# Patient Record
Sex: FEMALE | Race: AMERICAN INDIAN OR ALASKA NATIVE
[De-identification: names, ages, dates, MRNs, and addresses within clinical notes are randomized per-mention and may not be internally consistent; named-entity substitution may affect disease eponyms.]

---

## 2017-03-15 ENCOUNTER — HOSPITAL ENCOUNTER (EMERGENCY)
Dept: HOSPITAL 5 - ED | Age: 37
Discharge: HOME | End: 2017-03-15
Payer: MEDICAID

## 2017-03-15 VITALS — DIASTOLIC BLOOD PRESSURE: 104 MMHG | SYSTOLIC BLOOD PRESSURE: 173 MMHG
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
## 2017-03-15 DIAGNOSIS — I10: ICD-10-CM
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
## 2017-03-15 DIAGNOSIS — S93.401A: Primary | ICD-10-CM
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
## 2017-03-15 DIAGNOSIS — Z88.8: ICD-10-CM
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
## 2017-03-15 NOTE — XRAY REPORT
RIGHT ANKLE, 3 views:



History: Pain after long.



Findings: Mild soft tissue swelling is identified.  No acute osseous 

abnormality or joint pathology is identified.  The fifth metatarsal 

base is intact.



Impression: Soft tissue swelling.  No acute osseous injury.

## 2017-06-30 ENCOUNTER — HOSPITAL ENCOUNTER (EMERGENCY)
Dept: HOSPITAL 5 - ED | Age: 37
Discharge: HOME | End: 2017-06-30
Payer: MEDICAID

## 2017-06-30 VITALS — SYSTOLIC BLOOD PRESSURE: 152 MMHG | DIASTOLIC BLOOD PRESSURE: 90 MMHG

## 2017-06-30 DIAGNOSIS — Z88.8: ICD-10-CM

## 2017-06-30 DIAGNOSIS — S16.1XXA: Primary | ICD-10-CM

## 2017-06-30 DIAGNOSIS — G89.29: ICD-10-CM
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
## 2017-06-30 DIAGNOSIS — F17.200: ICD-10-CM

## 2017-06-30 DIAGNOSIS — S39.012A: ICD-10-CM
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
## 2017-06-30 DIAGNOSIS — Z98.890: ICD-10-CM
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
## 2017-06-30 DIAGNOSIS — I10: ICD-10-CM

## 2017-06-30 LAB
ALBUMIN SERPL-MCNC: 4.4 G/DL (ref 3.9–5)
ALBUMIN/GLOB SERPL: 1.3 %
ALP SERPL-CCNC: 58 UNITS/L (ref 35–129)
ALT SERPL-CCNC: 6 UNITS/L (ref 7–56)
ANION GAP SERPL CALC-SCNC: 17 MMOL/L
BASOPHILS NFR BLD AUTO: 0.6 % (ref 0–1.8)
BILIRUB SERPL-MCNC: 0.6 MG/DL (ref 0.1–1.2)
BILIRUB UR QL STRIP: (no result)
BLOOD UR QL VISUAL: (no result)
BUN SERPL-MCNC: 9 MG/DL (ref 7–17)
BUN/CREAT SERPL: 11.25 %
CALCIUM SERPL-MCNC: 9.2 MG/DL (ref 8.4–10.2)
CHLORIDE SERPL-SCNC: 102.6 MMOL/L (ref 98–107)
CO2 SERPL-SCNC: 25 MMOL/L (ref 22–30)
EOSINOPHIL NFR BLD AUTO: 0.2 % (ref 0–4.3)
GLUCOSE SERPL-MCNC: 107 MG/DL (ref 65–100)
HCT VFR BLD CALC: 30.6 % (ref 30.3–42.9)
HGB BLD-MCNC: 9.1 GM/DL (ref 10.1–14.3)
KETONES UR STRIP-MCNC: (no result) MG/DL
LEUKOCYTE ESTERASE UR QL STRIP: (no result)
MCH RBC QN AUTO: 21 PG (ref 28–32)
MCHC RBC AUTO-ENTMCNC: 30 % (ref 30–34)
MCV RBC AUTO: 70 FL (ref 79–97)
NITRITE UR QL STRIP: (no result)
PH UR STRIP: 5 [PH] (ref 5–7)
PLATELET # BLD: 236 K/MM3 (ref 140–440)
POTASSIUM SERPL-SCNC: 4 MMOL/L (ref 3.6–5)
PROT SERPL-MCNC: 7.7 G/DL (ref 6.3–8.2)
PROT UR STRIP-MCNC: (no result) MG/DL
RBC # BLD AUTO: 4.41 M/MM3 (ref 3.65–5.03)
RBC #/AREA URNS HPF: 4 /HPF (ref 0–6)
SODIUM SERPL-SCNC: 141 MMOL/L (ref 137–145)
UROBILINOGEN UR-MCNC: < 2 MG/DL (ref ?–2)
WBC # BLD AUTO: 9 K/MM3 (ref 4.5–11)
WBC #/AREA URNS HPF: 5 /HPF (ref 0–6)

## 2017-06-30 PROCEDURE — 36415 COLL VENOUS BLD VENIPUNCTURE: CPT

## 2017-06-30 PROCEDURE — 81001 URINALYSIS AUTO W/SCOPE: CPT

## 2017-06-30 PROCEDURE — 90715 TDAP VACCINE 7 YRS/> IM: CPT

## 2017-06-30 PROCEDURE — 90471 IMMUNIZATION ADMIN: CPT

## 2017-06-30 PROCEDURE — 85025 COMPLETE CBC W/AUTO DIFF WBC: CPT

## 2017-06-30 PROCEDURE — 72131 CT LUMBAR SPINE W/O DYE: CPT

## 2017-06-30 PROCEDURE — 71020: CPT

## 2017-06-30 PROCEDURE — 80053 COMPREHEN METABOLIC PANEL: CPT

## 2017-06-30 PROCEDURE — 72125 CT NECK SPINE W/O DYE: CPT

## 2017-06-30 PROCEDURE — 81025 URINE PREGNANCY TEST: CPT
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
## 2019-07-04 ENCOUNTER — HOSPITAL ENCOUNTER (EMERGENCY)
Dept: HOSPITAL 5 - ED | Age: 39
Discharge: HOME | End: 2019-07-04
Payer: MEDICAID

## 2019-07-04 VITALS — DIASTOLIC BLOOD PRESSURE: 89 MMHG | SYSTOLIC BLOOD PRESSURE: 134 MMHG

## 2019-07-04 DIAGNOSIS — F17.200: ICD-10-CM

## 2019-07-04 DIAGNOSIS — Z88.8: ICD-10-CM

## 2019-07-04 DIAGNOSIS — M54.16: Primary | ICD-10-CM

## 2019-07-04 DIAGNOSIS — Z79.899: ICD-10-CM

## 2019-07-04 DIAGNOSIS — M19.90: ICD-10-CM

## 2019-07-04 DIAGNOSIS — I10: ICD-10-CM

## 2019-07-04 LAB
BACTERIA #/AREA URNS HPF: (no result) /HPF
BILIRUB UR QL STRIP: (no result)
BLOOD UR QL VISUAL: (no result)
PH UR STRIP: 6 [PH] (ref 5–7)
PROT UR STRIP-MCNC: (no result) MG/DL
RBC #/AREA URNS HPF: 2 /HPF (ref 0–6)
UROBILINOGEN UR-MCNC: 2 MG/DL (ref ?–2)
WBC #/AREA URNS HPF: 1 /HPF (ref 0–6)

## 2019-07-04 PROCEDURE — 81025 URINE PREGNANCY TEST: CPT

## 2019-07-04 PROCEDURE — 81001 URINALYSIS AUTO W/SCOPE: CPT

## 2019-07-04 NOTE — EMERGENCY DEPARTMENT REPORT
HPI





- General


Chief Complaint: Back Pain/Injury


Time Seen by Provider: 19 14:56





- HPI


HPI: 





This is a 38-year-old female with a history of lumbar disc herniation in L5 who 

presents to the ED complaining of acute on chronic back pain worsened by prolo

nged standing at work today.  The patient denies any trauma, injuries or falls 

today.  Patient states that pain is a progressive lower back and radiates down 

her right thigh





ED Past Medical Hx





- Past Medical History


Hx Hypertension: Yes


Hx Arthritis: Yes


Additional medical history: CHRONIC BACK PAIN





- Surgical History


Additional Surgical History: 





- Social History


Smoking Status: Current Every Day Smoker


Substance Use Type: Alcohol





- Medications


Home Medications: 


                                Home Medications











 Medication  Instructions  Recorded  Confirmed  Last Taken  Type


 


Metoprolol Xl [Metoprolol 50 mg PO QDAY #60 tablet 03/15/17  Unknown Rx





SUCCINATE ER TAB]     


 


hydroCHLOROthiazide [HCTZ] 25 mg PO QDAY #60 tablet 03/15/17  Unknown Rx


 


HYDROcodone/APAP 5-325 [[place] 1 each PO Q6HR PRN #12 tablet 17  Unknown Rx





5/325]     


 


diazePAM TAB [Valium] 2 mg PO TID PRN #12 tablet 17  Unknown Rx


 


Gabapentin [Neurontin] 300 mg PO BID #30 cap 19  Unknown Rx














ED Review of Systems


ROS: 


Stated complaint: LOW BACK PAIN


Other details as noted in HPI





Comment: All other systems reviewed and negative





Physical Exam





- Physical Exam


Vital Signs: 


                                   Vital Signs











  19





  14:56


 


Temperature 98 F


 


Pulse Rate 71


 


Respiratory 16





Rate 


 


Blood Pressure 134/89





[Left] 


 


O2 Sat by Pulse 95





Oximetry 











Physical Exam: 





GENERAL: Alert and oriented x3, no apparent distress, Normal Gait, atraumatic.


HEAD: Head is normocephalic and a-traumatic.





NECK: Supple. Non edematous,   No lymphadenopathy or thyromegaly.  No C-spine 

tenderness, full range of motion


LUNGS: Symetrical with respiration, No wheezing, no rales or crackles, CTAB.


HEART:  S1, S2 present, regular rate and rhythm without murmur, no rubs, no 

gallops. Non tender to palpation





BACK: Full range of motion, no spinal tenderness,  Tenderness to palpation of 

the trapezius muscles and latissimus dorsi muscles of the back





EXTREMITIES/MUSCULOSKELETAL: No cyanosis, clubbing, rash, lesions or edema. Full

 ROM bilaterally. UE/LE Pulses 2+ bilaterally.  LE and UE 5+ strength 

bilaterally, 


NEUROLOGIC:  The patient is cooperative with no focal neurologic deficits. 


SKIN:  Warm and dry, No lesions, No ulceration or induration present.





ED Course


                                   Vital Signs











  19





  14:56


 


Temperature 98 F


 


Pulse Rate 71


 


Respiratory 16





Rate 


 


Blood Pressure 134/89





[Left] 


 


O2 Sat by Pulse 95





Oximetry 














ED Medical Decision Making





- Medical Decision Making





38-year-old female presents to ED with lumbar radiculopathy


ED course: 


Vital signs are normal patient is in no acute distress


Discussed with patient follow-up with primary care physician.  


Discussed the patient and take medications as prescribed. 


Patient has no neurological deficit.  Patient is alert and oriented 3  and 

understands all instructions given.


 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


Patient does state that she was taking gabapentin and is out of her gabapentin. 

 Patient does state she wants a referral for another orthopedic doctor because 

she sees Dr. Garcia and she is not happy with.


Critical care attestation.: 


If time is entered above; I have spent that time in minutes in the direct care 

of this critically ill patient, excluding procedure time.








ED Disposition


Clinical Impression: 


 Chronic back pain, Lumbar radicular pain





Disposition: DC- TO HOME OR SELFCARE


Is pt being admited?: No


Does the pt Need Aspirin: No


Condition: Stable


Instructions:  Chronic Back Pain (ED), Lumbar Radiculopathy (ED)


Additional Instructions: 


Make sure to follow up with the primary care physician as discussed.


Take all your medications as you've been prescribed.


If you have any worsening symptoms or develop new symptoms please return to ED 

immediately.


Prescriptions: 


Gabapentin [Neurontin] 300 mg PO BID #30 cap


Referrals: 


[facility] [Primary Care Provider] - 3-5 Days


[facility] [Provider Group] - 3-5 Days


[facility],  [Provider Group] - 3-5 Days


[facility] [Provider Group] - 3-5 Days


Forms:  Work/School Release Form


Time of Disposition: 16:30

## 2019-07-04 NOTE — EVENT NOTE
ED Screening Note


ED Screening Note: 


a/c low back pain





no home meds





no dysuria, no fever or chills





rx


norvasc


atenolol





no fall or trauma





This initial assessment/diagnostic orders/clinical plan/treatment(s) is/are 

subject to change based on patients health status, clinical progression and re-

assessment by fellow clinical providers in the ED. Further treatment and workup 

at subsequent clinical providers discretion. Patient/guardian urged not to elope

from the ED as their condition may be serious if not clinically assessed and 

managed. 





Initial orders include: 


IM pain med in [facility]